# Patient Record
Sex: MALE | Race: WHITE | NOT HISPANIC OR LATINO | URBAN - METROPOLITAN AREA
[De-identification: names, ages, dates, MRNs, and addresses within clinical notes are randomized per-mention and may not be internally consistent; named-entity substitution may affect disease eponyms.]

---

## 2017-12-13 PROBLEM — Z00.00 ENCOUNTER FOR PREVENTIVE HEALTH EXAMINATION: Status: ACTIVE | Noted: 2017-12-13

## 2017-12-15 ENCOUNTER — OUTPATIENT (OUTPATIENT)
Dept: OUTPATIENT SERVICES | Facility: HOSPITAL | Age: 22
LOS: 1 days | End: 2017-12-15
Payer: COMMERCIAL

## 2017-12-15 ENCOUNTER — APPOINTMENT (OUTPATIENT)
Dept: MRI IMAGING | Facility: CLINIC | Age: 22
End: 2017-12-15
Payer: COMMERCIAL

## 2017-12-15 DIAGNOSIS — Z00.8 ENCOUNTER FOR OTHER GENERAL EXAMINATION: ICD-10-CM

## 2017-12-15 PROCEDURE — 73221 MRI JOINT UPR EXTREM W/O DYE: CPT

## 2017-12-15 PROCEDURE — 73221 MRI JOINT UPR EXTREM W/O DYE: CPT | Mod: 26,RT

## 2018-08-07 VITALS — HEIGHT: 71.5 IN | BODY MASS INDEX: 28.21 KG/M2 | WEIGHT: 206 LBS

## 2019-06-12 ENCOUNTER — APPOINTMENT (OUTPATIENT)
Dept: PEDIATRICS | Facility: CLINIC | Age: 24
End: 2019-06-12
Payer: COMMERCIAL

## 2019-06-12 VITALS
WEIGHT: 209.38 LBS | BODY MASS INDEX: 28.67 KG/M2 | TEMPERATURE: 98.1 F | DIASTOLIC BLOOD PRESSURE: 79 MMHG | HEIGHT: 71.5 IN | SYSTOLIC BLOOD PRESSURE: 122 MMHG | HEART RATE: 64 BPM

## 2019-06-12 DIAGNOSIS — L23.7 ALLERGIC CONTACT DERMATITIS DUE TO PLANTS, EXCEPT FOOD: ICD-10-CM

## 2019-06-12 PROCEDURE — 99214 OFFICE O/P EST MOD 30 MIN: CPT

## 2019-06-12 RX ORDER — FLUTICASONE PROPIONATE 50 UG/1
50 SPRAY, METERED NASAL
Qty: 48 | Refills: 0 | Status: ACTIVE | COMMUNITY
Start: 2019-01-01

## 2019-06-12 RX ORDER — AZELASTINE HYDROCHLORIDE 137 UG/1
137 SPRAY, METERED NASAL
Qty: 90 | Refills: 0 | Status: DISCONTINUED | COMMUNITY
Start: 2019-03-08

## 2019-06-12 RX ORDER — PREDNISONE 20 MG/1
20 TABLET ORAL DAILY
Qty: 13 | Refills: 0 | Status: COMPLETED | COMMUNITY
Start: 2019-06-12 | End: 2019-06-17

## 2019-06-12 RX ORDER — HYDROCORTISONE 25 MG/G
2.5 OINTMENT TOPICAL TWICE DAILY
Qty: 1 | Refills: 2 | Status: ACTIVE | COMMUNITY
Start: 2019-06-12 | End: 1900-01-01

## 2019-06-12 RX ORDER — MUPIROCIN 20 MG/G
2 OINTMENT TOPICAL 3 TIMES DAILY
Qty: 1 | Refills: 2 | Status: ACTIVE | COMMUNITY
Start: 2019-06-12 | End: 1900-01-01

## 2019-06-12 RX ORDER — PREDNISONE 20 MG/1
20 TABLET ORAL AS DIRECTED
Qty: 26 | Refills: 0 | Status: ACTIVE | COMMUNITY
Start: 2019-06-12 | End: 1900-01-01

## 2019-07-19 ENCOUNTER — RECORD ABSTRACTING (OUTPATIENT)
Age: 24
End: 2019-07-19

## 2019-07-19 DIAGNOSIS — D23.9 OTHER BENIGN NEOPLASM OF SKIN, UNSPECIFIED: ICD-10-CM

## 2019-07-31 ENCOUNTER — APPOINTMENT (OUTPATIENT)
Dept: PEDIATRICS | Facility: CLINIC | Age: 24
End: 2019-07-31
Payer: COMMERCIAL

## 2019-07-31 PROCEDURE — 90651 9VHPV VACCINE 2/3 DOSE IM: CPT

## 2019-07-31 PROCEDURE — 90471 IMMUNIZATION ADMIN: CPT

## 2019-08-14 ENCOUNTER — APPOINTMENT (OUTPATIENT)
Dept: PEDIATRICS | Facility: CLINIC | Age: 24
End: 2019-08-14

## 2020-01-09 ENCOUNTER — APPOINTMENT (OUTPATIENT)
Dept: PEDIATRICS | Facility: CLINIC | Age: 25
End: 2020-01-09
Payer: COMMERCIAL

## 2020-01-09 PROCEDURE — 90471 IMMUNIZATION ADMIN: CPT

## 2020-01-09 PROCEDURE — 90651 9VHPV VACCINE 2/3 DOSE IM: CPT

## 2024-04-25 ENCOUNTER — NON-APPOINTMENT (OUTPATIENT)
Age: 29
End: 2024-04-25

## 2024-05-06 ENCOUNTER — NON-APPOINTMENT (OUTPATIENT)
Age: 29
End: 2024-05-06

## 2024-05-13 ENCOUNTER — APPOINTMENT (OUTPATIENT)
Dept: ORTHOPEDIC SURGERY | Facility: CLINIC | Age: 29
End: 2024-05-13
Payer: COMMERCIAL

## 2024-05-13 DIAGNOSIS — S63.619A UNSPECIFIED SPRAIN OF UNSPECIFIED FINGER, INITIAL ENCOUNTER: ICD-10-CM

## 2024-05-13 PROCEDURE — 99203 OFFICE O/P NEW LOW 30 MIN: CPT

## 2024-05-13 PROCEDURE — 73130 X-RAY EXAM OF HAND: CPT | Mod: RT

## 2024-05-13 NOTE — HISTORY OF PRESENT ILLNESS
[] : yes [de-identified] : 5/13/24: 28yo male (LHD. ) presents for RIGHT hand pain after he collided with another player during a rugby game, hyperextending his fingers, ~3 weeks ago. Pain localized around 2nd MPJ. Went to [facility] a few days later on 4/26/24 => XR R hand, splint. Removed splint after 2 weeks. Reports h/o RIGHT ring and small finger fractures. Went to [facility] on 5/7/24 for tick bite => PO Doxycycline.  Hx: none. [FreeTextEntry1] : RIGHT hand  [FreeTextEntry5] : BEENA dunbar [LHD] 29 year old male is here today c/o RIGHT hand pain x 3 weeks after hand was struck by a player during rugby game. went to [facility] a few days later on 04/26/24 +xrays +splint. c/o pain at knuckle of 2nd MCP. h/o RIGHT small finger fracture.  [de-identified] : 04/26/24 [de-identified] : [facility] [de-identified] : xrays

## 2024-05-13 NOTE — ASSESSMENT
[FreeTextEntry1] : The condition was explained to the patient. Minimal pain with ROM and no instability on exam today. - applied micha straps to IF/MF, PRN gym/sports. reviewed application of strap (slide only one finger through loop, then strap to adjacent finger, tight enough that the strap does not slide off, but not so tight that it causes indentation or discoloration. patient expressed understanding.  - recommend Voltaren gel PRN.  F/u PRN.

## 2024-05-13 NOTE — IMAGING
[de-identified] : RIGHT HAND skin intact. no swelling. no TTP. wrist ROM: good extension, flexion. good pronation, supination. good EPL, FPL. good finger extension, flex to full fist. good finger abduction and adduction.  SILT to median, ulnar, radial distribution.  palpable radial pulse, brisk cap refill all digits. no triggering.  MF PIPJ: + min pain flexion. no pain with radial/ulnar stress. no pain with IF/MF MPJ flexion or radial/ulnar stress.   XRAYS OF RIGHT HAND [facility] 4/26/24: no acute displaced fracture or dislocation. djd of PIPJ of ring finger. XRAYS OF RIGHT HAND: no acute displaced fracture or dislocation. djd of PIPJ of ring finger.